# Patient Record
Sex: FEMALE | URBAN - METROPOLITAN AREA
[De-identification: names, ages, dates, MRNs, and addresses within clinical notes are randomized per-mention and may not be internally consistent; named-entity substitution may affect disease eponyms.]

---

## 2023-11-21 LAB
C. TRACHOMATIS, EXTERNAL RESULT: NEGATIVE
N. GONORRHOEAE, EXTERNAL RESULT: NEGATIVE

## 2023-12-21 LAB
ABO, EXTERNAL RESULT: NORMAL
GBS, EXTERNAL RESULT: POSITIVE
HEP B, EXTERNAL RESULT: NEGATIVE
HIV, EXTERNAL RESULT: NORMAL
RH FACTOR, EXTERNAL RESULT: POSITIVE
RPR, EXTERNAL RESULT: NORMAL
RUBELLA TITER, EXTERNAL RESULT: NORMAL

## 2024-07-18 ENCOUNTER — ANESTHESIA (OUTPATIENT)
Dept: LABOR AND DELIVERY | Age: 24
End: 2024-07-18
Payer: COMMERCIAL

## 2024-07-18 ENCOUNTER — ANESTHESIA EVENT (OUTPATIENT)
Dept: LABOR AND DELIVERY | Age: 24
End: 2024-07-18
Payer: COMMERCIAL

## 2024-07-18 ENCOUNTER — HOSPITAL ENCOUNTER (INPATIENT)
Age: 24
LOS: 2 days | Discharge: HOME | DRG: 560 | End: 2024-07-20
Attending: OBSTETRICS & GYNECOLOGY | Admitting: OBSTETRICS & GYNECOLOGY
Payer: COMMERCIAL

## 2024-07-18 ENCOUNTER — APPOINTMENT (OUTPATIENT)
Dept: LABOR AND DELIVERY | Age: 24
DRG: 560 | End: 2024-07-18
Payer: COMMERCIAL

## 2024-07-18 PROBLEM — Z34.90 ENCOUNTER FOR INDUCTION OF LABOR: Status: ACTIVE | Noted: 2024-07-18

## 2024-07-18 LAB
ABO + RH BLD: NORMAL
AMPHETAMINES UR QL SCN>1000 NG/ML: NORMAL
BARBITURATES UR QL SCN>200 NG/ML: NORMAL
BASOPHILS # BLD: 0 K/UL (ref 0–0.2)
BASOPHILS NFR BLD: 0.3 %
BENZODIAZ UR QL SCN>200 NG/ML: NORMAL
BLD GP AB SCN SERPL QL: NORMAL
CANNABINOIDS UR QL SCN>50 NG/ML: NORMAL
COCAINE UR QL SCN: NORMAL
DEPRECATED RDW RBC AUTO: 19.1 % (ref 12.4–15.4)
DRUG SCREEN COMMENT UR-IMP: NORMAL
EOSINOPHIL # BLD: 0.2 K/UL (ref 0–0.6)
EOSINOPHIL NFR BLD: 2.8 %
FENTANYL SCREEN, URINE: NORMAL
HCT VFR BLD AUTO: 27.5 % (ref 36–48)
HGB BLD-MCNC: 8.7 G/DL (ref 12–16)
LYMPHOCYTES # BLD: 1.3 K/UL (ref 1–5.1)
LYMPHOCYTES NFR BLD: 14.2 %
MCH RBC QN AUTO: 24.5 PG (ref 26–34)
MCHC RBC AUTO-ENTMCNC: 31.5 G/DL (ref 31–36)
MCV RBC AUTO: 77.8 FL (ref 80–100)
METHADONE UR QL SCN>300 NG/ML: NORMAL
MONOCYTES # BLD: 0.7 K/UL (ref 0–1.3)
MONOCYTES NFR BLD: 8.1 %
NEUTROPHILS # BLD: 6.7 K/UL (ref 1.7–7.7)
NEUTROPHILS NFR BLD: 74.6 %
OPIATES UR QL SCN>300 NG/ML: NORMAL
OXYCODONE UR QL SCN: NORMAL
PCP UR QL SCN>25 NG/ML: NORMAL
PH UR STRIP: 6 [PH]
PLATELET # BLD AUTO: 175 K/UL (ref 135–450)
PMV BLD AUTO: 9.2 FL (ref 5–10.5)
RBC # BLD AUTO: 3.53 M/UL (ref 4–5.2)
WBC # BLD AUTO: 9 K/UL (ref 4–11)

## 2024-07-18 PROCEDURE — 80307 DRUG TEST PRSMV CHEM ANLYZR: CPT

## 2024-07-18 PROCEDURE — 10907ZC DRAINAGE OF AMNIOTIC FLUID, THERAPEUTIC FROM PRODUCTS OF CONCEPTION, VIA NATURAL OR ARTIFICIAL OPENING: ICD-10-PCS | Performed by: OBSTETRICS & GYNECOLOGY

## 2024-07-18 PROCEDURE — 6360000002 HC RX W HCPCS: Performed by: NURSE ANESTHETIST, CERTIFIED REGISTERED

## 2024-07-18 PROCEDURE — 86900 BLOOD TYPING SEROLOGIC ABO: CPT

## 2024-07-18 PROCEDURE — 6370000000 HC RX 637 (ALT 250 FOR IP)

## 2024-07-18 PROCEDURE — 86780 TREPONEMA PALLIDUM: CPT

## 2024-07-18 PROCEDURE — 0UQMXZZ REPAIR VULVA, EXTERNAL APPROACH: ICD-10-PCS | Performed by: OBSTETRICS & GYNECOLOGY

## 2024-07-18 PROCEDURE — 86901 BLOOD TYPING SEROLOGIC RH(D): CPT

## 2024-07-18 PROCEDURE — 6360000002 HC RX W HCPCS: Performed by: OBSTETRICS & GYNECOLOGY

## 2024-07-18 PROCEDURE — 2580000003 HC RX 258: Performed by: OBSTETRICS & GYNECOLOGY

## 2024-07-18 PROCEDURE — 2500000003 HC RX 250 WO HCPCS: Performed by: NURSE ANESTHETIST, CERTIFIED REGISTERED

## 2024-07-18 PROCEDURE — 85025 COMPLETE CBC W/AUTO DIFF WBC: CPT

## 2024-07-18 PROCEDURE — 3E033VJ INTRODUCTION OF OTHER HORMONE INTO PERIPHERAL VEIN, PERCUTANEOUS APPROACH: ICD-10-PCS | Performed by: OBSTETRICS & GYNECOLOGY

## 2024-07-18 PROCEDURE — 3700000025 EPIDURAL BLOCK: Performed by: ANESTHESIOLOGY

## 2024-07-18 PROCEDURE — 1220000000 HC SEMI PRIVATE OB R&B

## 2024-07-18 PROCEDURE — 86850 RBC ANTIBODY SCREEN: CPT

## 2024-07-18 PROCEDURE — 0KQM0ZZ REPAIR PERINEUM MUSCLE, OPEN APPROACH: ICD-10-PCS | Performed by: OBSTETRICS & GYNECOLOGY

## 2024-07-18 RX ORDER — MISOPROSTOL 100 UG/1
400 TABLET ORAL PRN
Status: DISCONTINUED | OUTPATIENT
Start: 2024-07-18 | End: 2024-07-19

## 2024-07-18 RX ORDER — ACETAMINOPHEN 500 MG
TABLET ORAL
Status: COMPLETED
Start: 2024-07-18 | End: 2024-07-18

## 2024-07-18 RX ORDER — SODIUM CHLORIDE 9 MG/ML
25 INJECTION, SOLUTION INTRAVENOUS PRN
Status: DISCONTINUED | OUTPATIENT
Start: 2024-07-18 | End: 2024-07-19

## 2024-07-18 RX ORDER — ACETAMINOPHEN 500 MG
1000 TABLET ORAL EVERY 8 HOURS PRN
Status: DISCONTINUED | OUTPATIENT
Start: 2024-07-18 | End: 2024-07-19

## 2024-07-18 RX ORDER — SODIUM CHLORIDE, SODIUM LACTATE, POTASSIUM CHLORIDE, AND CALCIUM CHLORIDE .6; .31; .03; .02 G/100ML; G/100ML; G/100ML; G/100ML
500 INJECTION, SOLUTION INTRAVENOUS PRN
Status: DISCONTINUED | OUTPATIENT
Start: 2024-07-18 | End: 2024-07-19

## 2024-07-18 RX ORDER — ONDANSETRON 4 MG/1
4 TABLET, ORALLY DISINTEGRATING ORAL EVERY 6 HOURS PRN
Status: DISCONTINUED | OUTPATIENT
Start: 2024-07-18 | End: 2024-07-19

## 2024-07-18 RX ORDER — BUPIVACAINE HYDROCHLORIDE 2.5 MG/ML
INJECTION, SOLUTION EPIDURAL; INFILTRATION; INTRACAUDAL PRN
Status: DISCONTINUED | OUTPATIENT
Start: 2024-07-18 | End: 2024-07-19 | Stop reason: SDUPTHER

## 2024-07-18 RX ORDER — METHYLERGONOVINE MALEATE 0.2 MG/ML
200 INJECTION INTRAVENOUS PRN
Status: DISCONTINUED | OUTPATIENT
Start: 2024-07-18 | End: 2024-07-19

## 2024-07-18 RX ORDER — SODIUM CHLORIDE 0.9 % (FLUSH) 0.9 %
5-40 SYRINGE (ML) INJECTION EVERY 12 HOURS SCHEDULED
Status: DISCONTINUED | OUTPATIENT
Start: 2024-07-18 | End: 2024-07-19

## 2024-07-18 RX ORDER — SODIUM CHLORIDE, SODIUM LACTATE, POTASSIUM CHLORIDE, CALCIUM CHLORIDE 600; 310; 30; 20 MG/100ML; MG/100ML; MG/100ML; MG/100ML
INJECTION, SOLUTION INTRAVENOUS CONTINUOUS
Status: DISCONTINUED | OUTPATIENT
Start: 2024-07-18 | End: 2024-07-19

## 2024-07-18 RX ORDER — DOCUSATE SODIUM 100 MG/1
100 CAPSULE, LIQUID FILLED ORAL 2 TIMES DAILY
Status: DISCONTINUED | OUTPATIENT
Start: 2024-07-18 | End: 2024-07-19

## 2024-07-18 RX ORDER — TERBUTALINE SULFATE 1 MG/ML
0.25 INJECTION, SOLUTION SUBCUTANEOUS
Status: DISCONTINUED | OUTPATIENT
Start: 2024-07-18 | End: 2024-07-19

## 2024-07-18 RX ORDER — DESONIDE 0.5 MG/G
1 OINTMENT TOPICAL DAILY
Status: ON HOLD | COMMUNITY
Start: 2024-05-17 | End: 2024-07-20 | Stop reason: HOSPADM

## 2024-07-18 RX ORDER — TRANEXAMIC ACID 10 MG/ML
1000 INJECTION, SOLUTION INTRAVENOUS
Status: DISCONTINUED | OUTPATIENT
Start: 2024-07-18 | End: 2024-07-19

## 2024-07-18 RX ORDER — CARBOPROST TROMETHAMINE 250 UG/ML
250 INJECTION, SOLUTION INTRAMUSCULAR PRN
Status: DISCONTINUED | OUTPATIENT
Start: 2024-07-18 | End: 2024-07-19

## 2024-07-18 RX ORDER — PNV NO.95/FERROUS FUM/FOLIC AC 28MG-0.8MG
1 TABLET ORAL DAILY
COMMUNITY
Start: 2024-05-25

## 2024-07-18 RX ORDER — SODIUM CHLORIDE 0.9 % (FLUSH) 0.9 %
5-40 SYRINGE (ML) INJECTION PRN
Status: DISCONTINUED | OUTPATIENT
Start: 2024-07-18 | End: 2024-07-19

## 2024-07-18 RX ORDER — ONDANSETRON 2 MG/ML
4 INJECTION INTRAMUSCULAR; INTRAVENOUS EVERY 6 HOURS PRN
Status: DISCONTINUED | OUTPATIENT
Start: 2024-07-18 | End: 2024-07-19

## 2024-07-18 RX ADMIN — Medication 2.5 MILLION UNITS: at 18:32

## 2024-07-18 RX ADMIN — Medication 2.5 MILLION UNITS: at 14:31

## 2024-07-18 RX ADMIN — DEXTROSE MONOHYDRATE 5 MILLION UNITS: 5 INJECTION INTRAVENOUS at 10:31

## 2024-07-18 RX ADMIN — Medication 1 MILLI-UNITS/MIN: at 10:00

## 2024-07-18 RX ADMIN — BUPIVACAINE HYDROCHLORIDE 1 ML: 2.5 INJECTION, SOLUTION EPIDURAL; INFILTRATION; INTRACAUDAL; PERINEURAL at 22:34

## 2024-07-18 RX ADMIN — Medication 1000 MG: at 22:58

## 2024-07-18 RX ADMIN — Medication 15 ML/HR: at 22:44

## 2024-07-18 RX ADMIN — ACETAMINOPHEN 1000 MG: 500 TABLET ORAL at 22:58

## 2024-07-18 RX ADMIN — SODIUM CHLORIDE, POTASSIUM CHLORIDE, SODIUM LACTATE AND CALCIUM CHLORIDE: 600; 310; 30; 20 INJECTION, SOLUTION INTRAVENOUS at 18:04

## 2024-07-18 RX ADMIN — SODIUM CHLORIDE, POTASSIUM CHLORIDE, SODIUM LACTATE AND CALCIUM CHLORIDE: 600; 310; 30; 20 INJECTION, SOLUTION INTRAVENOUS at 22:22

## 2024-07-18 RX ADMIN — Medication 2.5 MILLION UNITS: at 22:52

## 2024-07-18 RX ADMIN — SODIUM CHLORIDE, POTASSIUM CHLORIDE, SODIUM LACTATE AND CALCIUM CHLORIDE: 600; 310; 30; 20 INJECTION, SOLUTION INTRAVENOUS at 10:00

## 2024-07-18 ASSESSMENT — PAIN SCALES - GENERAL: PAINLEVEL_OUTOF10: 5

## 2024-07-18 ASSESSMENT — PAIN DESCRIPTION - LOCATION: LOCATION: HEAD

## 2024-07-18 ASSESSMENT — PAIN DESCRIPTION - DESCRIPTORS: DESCRIPTORS: ACHING

## 2024-07-18 ASSESSMENT — PAIN - FUNCTIONAL ASSESSMENT: PAIN_FUNCTIONAL_ASSESSMENT: ACTIVITIES ARE NOT PREVENTED

## 2024-07-18 NOTE — PROGRESS NOTES
Department of Obstetrics and Gynecology  OB/GYN Attending   Labor Note       SUBJECTIVE: Asked to see patient to attempt AROM for labor augmentation. She is tolerating contractions well without epidural.      Vitals:    07/18/24 1700   BP: (!) 121/58   Pulse: 82   Resp: 16   Temp:    SpO2:         Fetal heart rate:       Baseline Heart Rate:  125 bpm       Accelerations:  present       Long Term Variability:  moderate       Decelerations:  absent          Contraction frequency: 2-5 minutes     Membranes:  intact, AROM attempted unable to perform unable to reach internal os on exam     Cervix:          Dilation:  2-3 cm         Effacement:  40%         Station:  -3         Position:  posterior           ASSESSMENT & PLAN:  IUP at 40.2 weeks     - FHT reassuring  -unable to reach internal os to perform AROM,, cervix palpates as still thick and tunnels, relayed findings to Dr Cheema plan to continue Pitocin for augmentation for now, currently on 13mu will reattempt in few hours with next exam, when she arrives to hospital.   - GBS pos-receiving PCN G for prophylaxis      SIMÓN Sauer DO  OB/GYN  House Doc

## 2024-07-18 NOTE — H&P
Department of Obstetrics and Gynecology   Obstetrics History and Physical        CHIEF COMPLAINT:  induction    HISTORY OF PRESENT ILLNESS:      The patient is a 23 y.o. female at 40w2d.  OB History          2    Para        Term                AB        Living   1         SAB        IAB        Ectopic        Molar        Multiple        Live Births                Patient presents with a chief complaint as above and is being admitted for induction    GBS positive     cfDNA- wnl female    Reviewed induction with Tu[place]      Estimated Due Date: Estimated Date of Delivery: 24        PAST OB HISTORY:  OB History          2    Para        Term                AB        Living   1         SAB        IAB        Ectopic        Molar        Multiple        Live Births                    Past Medical History:    No past medical history on file.  Past Surgical History:        Procedure Laterality Date    APPENDECTOMY       Allergies:  Patient has no known allergies.    Social History:    Social History     Socioeconomic History    Marital status:      Spouse name: Not on file    Number of children: Not on file    Years of education: Not on file    Highest education level: Not on file   Occupational History    Not on file   Tobacco Use    Smoking status: Never    Smokeless tobacco: Never   Vaping Use    Vaping Use: Never used   Substance and Sexual Activity    Alcohol use: Never    Drug use: Never    Sexual activity: Yes     Partners: Male   Other Topics Concern    Not on file   Social History Narrative    Not on file     Social Determinants of Health     Financial Resource Strain: Not on file   Food Insecurity: No Food Insecurity (2024)    Hunger Vital Sign     Worried About Running Out of Food in the Last Year: Never true     Ran Out of Food in the Last Year: Never true   Transportation Needs: No Transportation Needs (2024)    PRAPARE - Transportation     Lack of  Transportation (Medical): No     Lack of Transportation (Non-Medical): No   Physical Activity: Not on file   Stress: Not on file   Social Connections: Not on file   Intimate Partner Violence: Not on file   Housing Stability: Low Risk  (7/18/2024)    Housing Stability Vital Sign     Unable to Pay for Housing in the Last Year: No     Number of Places Lived in the Last Year: 1     Unstable Housing in the Last Year: No     Family History:   No family history on file.  Medications Prior to Admission:  Medications Prior to Admission: desonide (DESOWEN) 0.05 % ointment, Apply 1 Tube topically daily dermatitis  Prenatal Vit-Fe Fumarate-FA (PRENATAL VITAMINS) 28-0.8 MG TABS, Take 1 tablet by mouth daily        PHYSICAL EXAM:  Vitals:    07/18/24 0842 07/18/24 0902   BP: (!) 116/53    Pulse: 80    Resp: 16    Temp: 97.8 °F (36.6 °C)    TempSrc: Oral    SpO2: 98%    Weight:  90.7 kg (200 lb)   Height:  1.753 m (5' 9\")     General appearance:  awake, alert, cooperative, no apparent distress, and appears stated age  Neurologic:  Awake, alert, oriented to name, place and time.    Lungs:  No increased work of breathing, good air exchange  Abdomen:  Soft, non tender, gravid, consistent with her gestational age, EFW by Leopald's manouever was 7.5#   Fetal heart rate:  Reassuring.  Pelvis:  Adequate pelvis  Cervix: 2-3/40/-2  Contraction frequency:  irregular    Membranes:  Intact    Labs: pending    ASSESSMENT AND PLAN:    Labor: Admit, anticipate normal delivery, routine labor orders  Fetus: Reassuring  GBS: Yes-PCN  Other: morteza Cheema MD

## 2024-07-18 NOTE — PROGRESS NOTES
Pt feeling ctx  Vitals:    07/18/24 1803   BP: (!) 130/56   Pulse: 90   Resp: 18   Temp: 98 °F (36.7 °C)   SpO2: 96%     Cat 1 FHTs  SVE 4/50/-2, AROM for copious amounts of clear fluid     Pit at 14     Continue pitocin titration    Elizabeth Cheema MD

## 2024-07-18 NOTE — PLAN OF CARE
Problem: Pain  Goal: Verbalizes/displays adequate comfort level or baseline comfort level  Outcome: Progressing     Problem: Vaginal Birth or  Section  Goal: Fetal and maternal status remain reassuring during the birth process  Description:  Birth OB-Pregnancy care plan goal which identifies if the fetal and maternal status remain reassuring during the birth process  Outcome: Progressing     Problem: Infection - Adult  Goal: Absence of infection at discharge  Outcome: Progressing  Goal: Absence of infection during hospitalization  Outcome: Progressing  Goal: Absence of fever/infection during anticipated neutropenic period  Outcome: Progressing     Problem: Safety - Adult  Goal: Free from fall injury  Outcome: Progressing     Problem: Discharge Planning  Goal: Discharge to home or other facility with appropriate resources  Outcome: Progressing     Problem: Chronic Conditions and Co-morbidities  Goal: Patient's chronic conditions and co-morbidity symptoms are monitored and maintained or improved  Outcome: Progressing

## 2024-07-19 LAB
HCT VFR BLD AUTO: 28 % (ref 36–48)
HGB BLD-MCNC: 8.7 G/DL (ref 12–16)
REAGIN+T PALLIDUM IGG+IGM SERPL-IMP: NORMAL

## 2024-07-19 PROCEDURE — 6370000000 HC RX 637 (ALT 250 FOR IP): Performed by: OBSTETRICS & GYNECOLOGY

## 2024-07-19 PROCEDURE — 7200000001 HC VAGINAL DELIVERY

## 2024-07-19 PROCEDURE — 51701 INSERT BLADDER CATHETER: CPT

## 2024-07-19 PROCEDURE — 36415 COLL VENOUS BLD VENIPUNCTURE: CPT

## 2024-07-19 PROCEDURE — 85014 HEMATOCRIT: CPT

## 2024-07-19 PROCEDURE — 6360000002 HC RX W HCPCS: Performed by: OBSTETRICS & GYNECOLOGY

## 2024-07-19 PROCEDURE — 1220000000 HC SEMI PRIVATE OB R&B

## 2024-07-19 PROCEDURE — 85018 HEMOGLOBIN: CPT

## 2024-07-19 RX ORDER — ONDANSETRON 4 MG/1
4 TABLET, ORALLY DISINTEGRATING ORAL EVERY 6 HOURS PRN
Status: DISCONTINUED | OUTPATIENT
Start: 2024-07-19 | End: 2024-07-20 | Stop reason: HOSPADM

## 2024-07-19 RX ORDER — SODIUM CHLORIDE 0.9 % (FLUSH) 0.9 %
5-40 SYRINGE (ML) INJECTION EVERY 12 HOURS SCHEDULED
Status: DISCONTINUED | OUTPATIENT
Start: 2024-07-19 | End: 2024-07-20 | Stop reason: HOSPADM

## 2024-07-19 RX ORDER — KETOROLAC TROMETHAMINE 30 MG/ML
30 INJECTION, SOLUTION INTRAMUSCULAR; INTRAVENOUS EVERY 6 HOURS PRN
Status: DISCONTINUED | OUTPATIENT
Start: 2024-07-19 | End: 2024-07-20 | Stop reason: HOSPADM

## 2024-07-19 RX ORDER — SODIUM CHLORIDE, SODIUM LACTATE, POTASSIUM CHLORIDE, CALCIUM CHLORIDE 600; 310; 30; 20 MG/100ML; MG/100ML; MG/100ML; MG/100ML
INJECTION, SOLUTION INTRAVENOUS CONTINUOUS
Status: DISCONTINUED | OUTPATIENT
Start: 2024-07-19 | End: 2024-07-20 | Stop reason: HOSPADM

## 2024-07-19 RX ORDER — FERROUS SULFATE 325(65) MG
325 TABLET ORAL 2 TIMES DAILY WITH MEALS
Status: DISCONTINUED | OUTPATIENT
Start: 2024-07-19 | End: 2024-07-20 | Stop reason: HOSPADM

## 2024-07-19 RX ORDER — OXYCODONE HYDROCHLORIDE 5 MG/1
10 TABLET ORAL EVERY 4 HOURS PRN
Status: DISCONTINUED | OUTPATIENT
Start: 2024-07-19 | End: 2024-07-20 | Stop reason: HOSPADM

## 2024-07-19 RX ORDER — IBUPROFEN 800 MG/1
800 TABLET ORAL EVERY 8 HOURS PRN
Status: DISCONTINUED | OUTPATIENT
Start: 2024-07-19 | End: 2024-07-20 | Stop reason: HOSPADM

## 2024-07-19 RX ORDER — SIMETHICONE 80 MG
80 TABLET,CHEWABLE ORAL EVERY 6 HOURS PRN
Status: DISCONTINUED | OUTPATIENT
Start: 2024-07-19 | End: 2024-07-20 | Stop reason: HOSPADM

## 2024-07-19 RX ORDER — DOCUSATE SODIUM 100 MG/1
100 CAPSULE, LIQUID FILLED ORAL 2 TIMES DAILY PRN
Status: DISCONTINUED | OUTPATIENT
Start: 2024-07-19 | End: 2024-07-20 | Stop reason: HOSPADM

## 2024-07-19 RX ORDER — ONDANSETRON 2 MG/ML
4 INJECTION INTRAMUSCULAR; INTRAVENOUS EVERY 6 HOURS PRN
Status: DISCONTINUED | OUTPATIENT
Start: 2024-07-19 | End: 2024-07-20 | Stop reason: HOSPADM

## 2024-07-19 RX ORDER — OXYCODONE HYDROCHLORIDE 5 MG/1
5 TABLET ORAL EVERY 4 HOURS PRN
Status: DISCONTINUED | OUTPATIENT
Start: 2024-07-19 | End: 2024-07-20 | Stop reason: HOSPADM

## 2024-07-19 RX ORDER — ACETAMINOPHEN 500 MG
1000 TABLET ORAL EVERY 8 HOURS PRN
Status: DISCONTINUED | OUTPATIENT
Start: 2024-07-19 | End: 2024-07-20 | Stop reason: HOSPADM

## 2024-07-19 RX ORDER — SODIUM CHLORIDE 0.9 % (FLUSH) 0.9 %
5-40 SYRINGE (ML) INJECTION PRN
Status: DISCONTINUED | OUTPATIENT
Start: 2024-07-19 | End: 2024-07-20 | Stop reason: HOSPADM

## 2024-07-19 RX ORDER — SODIUM CHLORIDE 9 MG/ML
INJECTION, SOLUTION INTRAVENOUS PRN
Status: DISCONTINUED | OUTPATIENT
Start: 2024-07-19 | End: 2024-07-20 | Stop reason: HOSPADM

## 2024-07-19 RX ORDER — LANOLIN 100 %
OINTMENT (GRAM) TOPICAL PRN
Status: DISCONTINUED | OUTPATIENT
Start: 2024-07-19 | End: 2024-07-20 | Stop reason: HOSPADM

## 2024-07-19 RX ADMIN — DOCUSATE SODIUM 100 MG: 100 CAPSULE, LIQUID FILLED ORAL at 12:47

## 2024-07-19 RX ADMIN — IBUPROFEN 800 MG: 800 TABLET, FILM COATED ORAL at 16:17

## 2024-07-19 RX ADMIN — ACETAMINOPHEN 1000 MG: 500 TABLET ORAL at 18:19

## 2024-07-19 RX ADMIN — DOCUSATE SODIUM 100 MG: 100 CAPSULE, LIQUID FILLED ORAL at 21:00

## 2024-07-19 RX ADMIN — Medication 7 MILLI-UNITS/MIN: at 02:03

## 2024-07-19 RX ADMIN — METHYLERGONOVINE MALEATE 200 MCG: 0.2 INJECTION, SOLUTION INTRAMUSCULAR; INTRAVENOUS at 04:38

## 2024-07-19 RX ADMIN — Medication 87.3 MILLI-UNITS/MIN: at 04:02

## 2024-07-19 ASSESSMENT — PAIN SCALES - GENERAL
PAINLEVEL_OUTOF10: 6
PAINLEVEL_OUTOF10: 5

## 2024-07-19 ASSESSMENT — PAIN DESCRIPTION - ORIENTATION
ORIENTATION: LOWER
ORIENTATION: LOWER

## 2024-07-19 ASSESSMENT — PAIN DESCRIPTION - LOCATION
LOCATION: ABDOMEN
LOCATION: ABDOMEN

## 2024-07-19 ASSESSMENT — PAIN DESCRIPTION - DESCRIPTORS
DESCRIPTORS: DISCOMFORT;CRAMPING
DESCRIPTORS: CRAMPING;DISCOMFORT

## 2024-07-19 ASSESSMENT — PAIN - FUNCTIONAL ASSESSMENT
PAIN_FUNCTIONAL_ASSESSMENT: ACTIVITIES ARE NOT PREVENTED
PAIN_FUNCTIONAL_ASSESSMENT: ACTIVITIES ARE NOT PREVENTED

## 2024-07-19 NOTE — CONSULTS
Session ID: [id number]  Language: Nigerian   ID: #[id number]   Name: Elidiaage: Nigerian   ID: #[id number]   Name: Marily

## 2024-07-19 NOTE — PROGRESS NOTES
Patient up to BR x2 assist. Patient voided, tolu care performed. Complete linen change done. Patient back to bed, tolerated well.

## 2024-07-19 NOTE — ANESTHESIA PRE PROCEDURE
Department of Anesthesiology  Preprocedure Note       Name:  Sonia Nicolas   Age:  23 y.o.  :  2000                                          MRN:  [id number]         Date:  2024      Surgeon: * No surgeons listed *    Procedure: * No procedures listed *    Medications prior to admission:   Prior to Admission medications    Medication Sig Start Date End Date Taking? Authorizing Provider   desonide (DESOWEN) 0.05 % ointment Apply 1 Tube topically daily dermatitis 24  Yes ProviderJaclyn MD   Prenatal Vit-Fe Fumarate-FA (PRENATAL VITAMINS) 28-0.8 MG TABS Take 1 tablet by mouth daily 24  Yes Provider, MD Jaclyn       Current medications:    Current Facility-Administered Medications   Medication Dose Route Frequency Provider Last Rate Last Admin    lactated ringers IV soln infusion   IntraVENous Continuous Elizabeth Cheema  mL/hr at 24 2200 Rate Verify at 24 2200    lactated ringers bolus 500 mL  500 mL IntraVENous PRN Elizabeth Cheema MD        Or    lactated ringers bolus 500 mL  500 mL IntraVENous PRN Elizabeth Cheema MD        sodium chloride flush 0.9 % injection 5-40 mL  5-40 mL IntraVENous 2 times per day Elizabeth Cheema MD        sodium chloride flush 0.9 % injection 5-40 mL  5-40 mL IntraVENous PRN Elizabeth Cheema MD        0.9 % sodium chloride infusion  25 mL IntraVENous PRN Elizabeth Cheema MD        methylergonovine (METHERGINE) injection 200 mcg  200 mcg IntraMUSCular PRN Elizabeth Cheema MD        carboprost (HEMABATE) injection 250 mcg  250 mcg IntraMUSCular PRN Elizabeth Cheema MD        miSOPROStol (CYTOTEC) tablet 400 mcg  400 mcg Buccal PRN Elizabeth Cheema MD        tranexamic acid-NaCl IVPB premix 1,000 mg  1,000 mg IntraVENous Once PRN Elizabeth Cheema MD        oxytocin (PITOCIN) 30 units in 500 mL infusion  87.3 manjit-units/min IntraVENous Continuous PRN Elizabeth Cheema MD        And    oxytocin (PITOCIN) 10 unit

## 2024-07-19 NOTE — PLAN OF CARE

## 2024-07-19 NOTE — PROGRESS NOTES
Patient actively pushing.  RN and MD remains in continuous attendance at the bedside.  Assessment & evaluation of fetal heart rate ongoing via continuous EFM.

## 2024-07-19 NOTE — LACTATION NOTE
This note was copied from a baby's chart.  Lactation Progress Note      Data:    Initial consult for multip on DOD with a LGA infant born at 40.1 weeks gestation. MOB breast fed her first for 6 months w/o complication. MOB reports current infant has been feeding well. At time of consult infant was latched at right breast in cradle position with a shallow latch, SRS noted. MOB speaks Italian, language line required.          Urine output:   established   Stool output:   established  Percent weight change from birth:  0%    Action:    Introduced self to patient as lactation, name and phone number written on white board in room. Discuss how latch looked and felt and suggested we break suction and re-latch more deeply, MOB agreeable. Educated and demonstrated how to break suction & re-latch deeply in cross cradle then move to cradle position. Nipple creased with release. Pointed this out to mother and guided hands for a SOMMER, SRS noted.     Mother complains of severe stomach pains, I called RN who came in and medicated mother, MOB rated pain 6-7 out of 10. Removed infant from breast and placed skin to skin per MOB request.     Educated mother about what to expect over the next  24-48 hours with infant feedings, infant output, how to know infant is getting enough, reinforced the importance of a deep latch and how to achieve it, how to break suction and try again if latch is shallow, normal  behavior, how to hand express colostrum, and breast care.     Educated mother about infant feeding cues and encouraged mother to allow infant to breast feed on demand anytime feeding cues are shown and if no feeding cues are shown to attempt to wake infant to feed every 2-3 hours. If infant is still too sleepy to latch to hand express colostrum into infants mouth for about ten minutes, then try again in 2-3 hours. After the first day of life to breast feed a minimum of 8-12 times a day per 24 hour period. All questions answered.  Mother encouraged to call lactation for F/U care as needed.     Response:    MOB verbalized an understanding of education provided and will call for assistance as needed.

## 2024-07-19 NOTE — PROGRESS NOTES
Pt feeling a little lightheaded. BP 93/52, IVF bolus. IV pitocin. moderate bleeding on pad. Uterus firm. Plan 1 dose methergine and check stat H&H . Admission H/H 8.7/27.5. close monitoring, discussed possibility of blood transfusion with pt and      Elizabeth Cheema MD

## 2024-07-19 NOTE — ANESTHESIA PROCEDURE NOTES
Epidural Block    Patient location during procedure: OB  Reason for block: labor epidural  Staffing  Resident/CRNA: Kristyn Osei APRN - CRNA  Performed by: Kristyn Osei APRN - CRNA  Authorized by: Antolin Rivas MD    Epidural  Patient position: sitting  Prep: ChloraPrep and site prepped and draped  Patient monitoring: continuous pulse ox and frequent blood pressure checks  Approach: midline  Location: L3-4  Injection technique: JATIN saline  Provider prep: sterile gloves and mask  Needle  Needle type: Tuohy   Needle gauge: 17 G  Needle length: 3.5 in  Needle insertion depth: 5 cm  Catheter type: side hole  Catheter size: 19 G  Catheter at skin depth: 11 cm  Test dose: negativeCatheter Secured: tegaderm  Assessment  Hemodynamics: stable  Attempts: 1  Outcomes: uncomplicated and patient tolerated procedure well  Additional Notes  Pt prepped and draped in sterile fashion.  Skin wheal with 1% lidocaine.  JATIN with 3 cc NS, 25g spinal needle inserted per eloina.  Clear CSF visualized in hub, 1 mL 0.25% Bupivacaine injected.  Needle withdrawn and catheter threaded.  Negative test dose 3cc 1.5% Lidocaine with Epinephrine.  Sterile dressing applied.   Preanesthetic Checklist  Completed: patient identified, IV checked, site marked, risks and benefits discussed, surgical/procedural consents, equipment checked, pre-op evaluation, timeout performed, anesthesia consent given, oxygen available, monitors applied/VS acknowledged and blood product R/B/A discussed and consented

## 2024-07-19 NOTE — L&D DELIVERY NOTE
24 0425      None                 End of Mother's Information  Mother: Sonia Nicolas #[id number]                Delivery Providers    Delivering clinician: Elizabeth Cheema MD     Provider Role    Elizabeth Cheema MD Obstetrician    Sunita Carrero, RN Primary Nurse    Stephanie Rojas RN Primary [place] Nurse     NICU Nurse     Neonatologist     Anesthesiologist     Nurse Anesthetist     Nurse Practitioner     Midwife     Nursery Nurse     Respiratory Therapist    Juana Umana Scrub Tech     Assistant Surgeon    Destiny Perdue RN Registered Nurse              [place] Assessment    Living Status: Living  Delivery Location Comment: 381        Skin Color:   Heart Rate:   Reflex Irritability:   Muscle Tone:   Respiratory Effort:   Total:            1 Minute:    1    2    2    2    2    9         5 Minute:    1    2    2    2    2    9                                        Apgars Assigned By: PATTI AMOR,RN              Resuscitation    Method: Room Air, Stimulation, Bulb Suction              Measurements               Skin to Skin      Skin to Skin Initiation Date/Time: 24 03:48:00 EDT     Skin to Skin With: Mother

## 2024-07-20 VITALS
RESPIRATION RATE: 20 BRPM | TEMPERATURE: 98.1 F | HEART RATE: 88 BPM | DIASTOLIC BLOOD PRESSURE: 59 MMHG | BODY MASS INDEX: 29.62 KG/M2 | OXYGEN SATURATION: 96 % | HEIGHT: 69 IN | SYSTOLIC BLOOD PRESSURE: 114 MMHG | WEIGHT: 200 LBS

## 2024-07-20 LAB
DEPRECATED RDW RBC AUTO: 19.2 % (ref 12.4–15.4)
HCT VFR BLD AUTO: 24.3 % (ref 36–48)
HGB BLD-MCNC: 7.5 G/DL (ref 12–16)
MCH RBC QN AUTO: 24.1 PG (ref 26–34)
MCHC RBC AUTO-ENTMCNC: 30.9 G/DL (ref 31–36)
MCV RBC AUTO: 78.1 FL (ref 80–100)
PLATELET # BLD AUTO: 179 K/UL (ref 135–450)
PMV BLD AUTO: 9.9 FL (ref 5–10.5)
RBC # BLD AUTO: 3.11 M/UL (ref 4–5.2)
WBC # BLD AUTO: 10.2 K/UL (ref 4–11)

## 2024-07-20 PROCEDURE — 6370000000 HC RX 637 (ALT 250 FOR IP): Performed by: OBSTETRICS & GYNECOLOGY

## 2024-07-20 PROCEDURE — 36415 COLL VENOUS BLD VENIPUNCTURE: CPT

## 2024-07-20 PROCEDURE — 85027 COMPLETE CBC AUTOMATED: CPT

## 2024-07-20 RX ORDER — IBUPROFEN 800 MG/1
800 TABLET ORAL EVERY 8 HOURS PRN
Qty: 60 TABLET | Refills: 0 | Status: SHIPPED | OUTPATIENT
Start: 2024-07-20

## 2024-07-20 RX ORDER — FERROUS SULFATE 325(65) MG
325 TABLET ORAL EVERY OTHER DAY
Qty: 30 TABLET | Refills: 0 | Status: SHIPPED | OUTPATIENT
Start: 2024-07-20

## 2024-07-20 RX ADMIN — IBUPROFEN 800 MG: 800 TABLET, FILM COATED ORAL at 10:23

## 2024-07-20 RX ADMIN — IBUPROFEN 800 MG: 800 TABLET, FILM COATED ORAL at 00:52

## 2024-07-20 RX ADMIN — FERROUS SULFATE TAB 325 MG (65 MG ELEMENTAL FE) 325 MG: 325 (65 FE) TAB at 10:19

## 2024-07-20 RX ADMIN — DOCUSATE SODIUM 100 MG: 100 CAPSULE, LIQUID FILLED ORAL at 10:19

## 2024-07-20 ASSESSMENT — PAIN SCALES - GENERAL
PAINLEVEL_OUTOF10: 2
PAINLEVEL_OUTOF10: 5

## 2024-07-20 ASSESSMENT — PAIN - FUNCTIONAL ASSESSMENT: PAIN_FUNCTIONAL_ASSESSMENT: ACTIVITIES ARE NOT PREVENTED

## 2024-07-20 NOTE — ANESTHESIA POSTPROCEDURE EVALUATION
Department of Anesthesiology  Postprocedure Note    Patient: Sonia Nicolas  MRN: [id number]  YOB: 2000  Date of evaluation: 7/20/2024    Procedure Summary       Date: 07/18/24 Room / Location:     Anesthesia Start: 2222 Anesthesia Stop: 07/19/24 0346    Procedure: Labor Analgesia Diagnosis:     Scheduled Providers:  Responsible Provider: Antolin Rivas MD    Anesthesia Type: epidural ASA Status: 2 - Emergent            Anesthesia Type: No value filed.    Indira Phase I:      Indira Phase II:      Anesthesia Post Evaluation    Comments: Per EMR review: No apparent anesthetic complications        No notable events documented.

## 2024-07-20 NOTE — FLOWSHEET NOTE
Postpartum and infant care teaching completed and a copy given to patient who verbalized understanding of all teaching points and denies further questions. Patient plans to follow-up with OB Provider as instructed.    ID bands checked. Father's ID band and one of baby's ID bands removed and taped to the chart by RN.   Patient discharged home in stable condition accompanied by fob.   Discharged via wheelchair, holding baby in a rear facing car seat.

## 2024-07-20 NOTE — DISCHARGE INSTRUCTIONS
Thank you for the opportunity to care for you and your family.    We hope that you are happy with the care we provided during your stay in the [facility]. We want to ensure that you have the help you need when you leave the hospital. If there is anything we can assist you with, please let us know.    Breastfeeding mothers may contact our lactation specialists with any problems or questions.  [facility] lactation services phone number is [phone].  Leave a message and your call will be returned.    Please refer to the information provided in the postpartum care booklet.  The following are warning signs to remember.    Call 911 if you have:    Chest pain or pressure  Shortness of breath, even at rest  Thoughts of harming yourself or others  Seizures    Call your healthcare provider if you have:    Temperature of 100.4 degrees or higher  Stitches that are not healing        -- Swelling, bleeding, drainage, foul odor, redness or warmth in/around your           stitches, staples, or incision (scar)        -- Bad smelling blood or discharge from the vagina  Vaginal bleeding that has increased         -- Soaking through one pad in an hour        -- You are passing clots larger than the size of a lemon  Red, warm tender area(s) in your breast or calf  Headache that does not get better, even after taking medicine; or headache with vision changes    Remember to notify all healthcare providers from your date of delivery to up to one year after giving birth!    CARING FOR YOURSELF        DIET/ACTIVITY    Eat a well balanced diet focusing on foods high in fiber and protein.  Drink plenty of fluids, especially water.  To avoid constipation you may take a mild stool softener as recommended by your doctor or midwife.  Gradually increase your activity. Resume an exercise regime only after being advised by your doctor or midwife.  When sitting or lying down, keep your legs elevated to reduce swelling.  Avoid

## 2024-07-20 NOTE — FLOWSHEET NOTE
Discharge Phone Call    Patient Name: Sonia Nicolas     OB Care Provider: Elizabeth Cheema MD Discharge Date: 2024    Disposition of baby:    Phone Number: [phone] (home)     Attempts to Contact:  Date:    Caller  Date:    Caller  Date:    Caller    Information for the patient's :  Jeff Nicolas [[id number]]   Delivery Method: Vaginal, Spontaneous     1.  Now that you are at home is your pain being well controlled?   Y/N   If no, instruct to call       provider.      2. Are you breastfeeding?    Y/N    Do you need any extra support from our lactation staff?      Y/N    If yes, provide number for lactation.  3. Have you made or already had your first appointment with the baby's doctor? Y/N   If no, do      you know when to schedule it?  Y/N    4. Have you scheduled your follow-up appointment?  Y/N  If no, do you know when to schedule       it?    Y/N   If no, they can find it on printed discharge instructions.  5. Did staff discuss safe sleep during your stay? Y/N   6. Did we explain things in a way you could understand?  Y/N  7. Were we respectful of your preferences for labor and birth and include you in the plan of       care?  Y/N  If no, please explain _______________________________________________  8. Is there anyone in particular you would like to mention who provided care for you? _______      _________________________________________________________________________     9. Were you given a Post-Birth Warning Signs handout?  Y/N  Do you have it somewhere      easily accessible?  Y/N  If no, please send them a copy and ask them to put it somewhere      easily found.  10. Have you been crying excessively, having anger or mood swings that feel out of control, or       feel like you can't cope with caring for yourself or baby? Y/N   If yes, they may be showing       signs of postpartum depression and should call provider. There is also a        depression test on page  C5 in their discharge booklet they can take.  13. Do you have any other questions or concerns I can address today? Y/N  ______________      _________________________________________________________________________    Information provided during call :_________________________________________________  ___________________________________________________________________________    Call completed by:____________________________    Date:_________ Time:___________

## 2024-07-20 NOTE — PROGRESS NOTES
[facility]  Labor and Delivery   Post Partum Progress Note      SUBJECTIVE:  PPD#1 s/p - female infant  Doing well  Desires dc home today    OBJECTIVE:      Vitals:  Vitals:    24 0413   BP: (!) 107/55   Pulse: 80   Resp: 16   Temp: 98.1 °F (36.7 °C)   SpO2:         Fundus firm, normal lochia  Extremities normal      DATA:    CBC:    Lab Results   Component Value Date/Time    WBC 10.2 2024 05:55 AM    RBC 3.11 2024 05:55 AM    HGB 7.5 2024 05:55 AM    HCT 24.3 2024 05:55 AM    MCV 78.1 2024 05:55 AM    RDW 19.2 2024 05:55 AM     2024 05:55 AM       ASSESSMENT & PLAN:       PPD#1 s/p - female infant  Dc home today, fu in office 6 weeks for pp visit   Motrin/ferrous sulfate scripts    Elizabeth Cheema MD

## 2024-07-20 NOTE — DISCHARGE SUMMARY
Obstetrical Discharge Form    Gestational Age:40w3d    Antepartum complications: none    Date of Delivery: 24    Type of Delivery: vaginal, spontaneous    Delivered By:  Dr. Cheema          Baby:   Information for the patient's :  Jeff Nicolas [[id number]]      Anesthesia: Epidural    Intrapartum complications: None    Postpartum complications: none    Discharge Medication:      Medication List        START taking these medications      ferrous sulfate 325 (65 Fe) MG tablet  Commonly known as: IRON 325  Take 1 tablet by mouth every other day     ibuprofen 800 MG tablet  Commonly known as: ADVIL;MOTRIN  Take 1 tablet by mouth every 8 hours as needed for Pain            CONTINUE taking these medications      Prenatal Vitamins 28-0.8 MG Tabs            STOP taking these medications      desonide 0.05 % ointment  Commonly known as: DESOWEN               Where to Get Your Medications        You can get these medications from any pharmacy    Bring a paper prescription for each of these medications  ferrous sulfate 325 (65 Fe) MG tablet  ibuprofen 800 MG tablet          Discharge Date: 24    Condition on discharge: Stable    Plan:   Follow up in 6 week(s)  Reviewed discharge instructions and scripts     Elizabeth Cheema MD

## 2024-07-20 NOTE — PLAN OF CARE
Problem: Pain  Goal: Verbalizes/displays adequate comfort level or baseline comfort level  Recent Flowsheet Documentation  Taken 2024 by Rafat Cerna, RN  Verbalizes/displays adequate comfort level or baseline comfort level:   Encourage patient to monitor pain and request assistance   Assess pain using appropriate pain scale  2024 1609 by June Roldan RN  Outcome: Progressing     Problem: Vaginal Birth or  Section  Goal: Fetal and maternal status remain reassuring during the birth process  Description:  Birth OB-Pregnancy care plan goal which identifies if the fetal and maternal status remain reassuring during the birth process  2024 1609 by June Roldan RN  Outcome: Progressing     Problem: Infection - Adult  Goal: Absence of infection at discharge  2024 1609 by June Roldan RN  Outcome: Progressing  Goal: Absence of infection during hospitalization  2024 1609 by June Roldan RN  Outcome: Progressing  Goal: Absence of fever/infection during anticipated neutropenic period  2024 1609 by June Roldan RN  Outcome: Progressing     Problem: Safety - Adult  Goal: Free from fall injury  2024 1609 by June Roldan RN  Outcome: Progressing     Problem: Discharge Planning  Goal: Discharge to home or other facility with appropriate resources  2024 1609 by June Roldan RN  Outcome: Progressing     Problem: Chronic Conditions and Co-morbidities  Goal: Patient's chronic conditions and co-morbidity symptoms are monitored and maintained or improved  2024 1609 by June Roldan RN  Outcome: Progressing     Problem: Postpartum  Goal: Experiences normal postpartum course  Description:  Postpartum OB-Pregnancy care plan goal which identifies if the mother is experiencing a normal postpartum course  20248 by Rafat Cerna, RN  Outcome: Progressing  2024 1609 by June Roldan RN  Outcome: Progressing  Goal: Appropriate  maternal -  bonding  Description:  Postpartum OB-Pregnancy care plan goal which identifies if the mother and  are bonding appropriately  2024 by Rafat Cerna, RN  Outcome: Progressing  2024 by June Roldan, RN  Outcome: Progressing  Goal: Establishment of infant feeding pattern  Description:  Postpartum OB-Pregnancy care plan goal which identifies if the mother is establishing a feeding pattern with their   2024 by Rafat Cerna, RN  Outcome: Progressing  2024 160 by June Roldan, RN  Outcome: Progressing  Goal: Incisions, wounds, or drain sites healing without S/S of infection  2024 by Rafat Cerna, RN  Outcome: Progressing  2024 by June Roldan, RN  Outcome: Progressing